# Patient Record
Sex: FEMALE | Race: WHITE | NOT HISPANIC OR LATINO | Employment: OTHER | URBAN - METROPOLITAN AREA
[De-identification: names, ages, dates, MRNs, and addresses within clinical notes are randomized per-mention and may not be internally consistent; named-entity substitution may affect disease eponyms.]

---

## 2022-11-10 ENCOUNTER — HOSPITAL ENCOUNTER (OUTPATIENT)
Facility: MEDICAL CENTER | Age: 32
End: 2022-11-10
Attending: OBSTETRICS & GYNECOLOGY
Payer: COMMERCIAL

## 2022-11-10 PROCEDURE — 88175 CYTOPATH C/V AUTO FLUID REDO: CPT

## 2022-11-10 PROCEDURE — 87624 HPV HI-RISK TYP POOLED RSLT: CPT

## 2022-11-14 LAB
CYTOLOGY REG CYTOL: NORMAL
HPV HR 12 DNA CVX QL NAA+PROBE: NEGATIVE
HPV16 DNA SPEC QL NAA+PROBE: NEGATIVE
HPV18 DNA SPEC QL NAA+PROBE: NEGATIVE
SPECIMEN SOURCE: NORMAL

## 2024-12-02 ENCOUNTER — APPOINTMENT (OUTPATIENT)
Dept: ADMISSIONS | Facility: MEDICAL CENTER | Age: 34
End: 2024-12-02
Attending: OBSTETRICS & GYNECOLOGY
Payer: COMMERCIAL

## 2024-12-06 ENCOUNTER — PRE-ADMISSION TESTING (OUTPATIENT)
Dept: ADMISSIONS | Facility: MEDICAL CENTER | Age: 34
End: 2024-12-06
Attending: OBSTETRICS & GYNECOLOGY
Payer: COMMERCIAL

## 2024-12-06 NOTE — OR NURSING
Preadmit phone appt completed, medication/fasting/hydration instructions given per anesthesia guidelines, pt verbalize understanding.  Preop labs-[facility] lives in [place] [facility] requesting to do beta hcg there as well, called Dr Roldan's office, they will contact [facility].

## 2024-12-18 NOTE — H&P
DATE OF ADMISSION:  2024     PREOPERATIVE DIAGNOSES:  1.  Dysmenorrhea.  2.  Dyspareunia.  3.  History of pelvic congestive syndrome.  4.  Abnormal uterine bleeding.  5.  Desires conservative surgical management.     PLANNED PROCEDURE:  Pelvic examination under anesthesia, pelviscopy, lysis of   adhesions, excision/fulguration of endometriosis implants, diagnostic and   operative hysteroscopy, excision of endometrial polyp/submucosal   fibroid/endometrial curettage with the MyoSure/oscillating cutting blade, and   any other medically necessary procedures.     HISTORY OF PRESENT ILLNESS:  The patient is a 34-year-old para 2-0-0-2,   sexually active woman with last menstrual period of 2024, who has a   known history of pelvic congestion syndrome and she was having continued   pelvic pain and abnormal uterine bleeding, who desires conservative surgical   management.  The patient has never before had a pelviscopy to assess for   endometriosis.  The patient underwent a pelvic ultrasound, which demonstrated   that her uterus measured 10.8 x 4.3 x 6.3 cm.  The endometrium measured 4 mm.    The right ovary measured 1.6 x 2.2 x 2.3 cm.  The left ovary measured 2.2 x   2.4 x 1.6 cm.  There were prominent pelvic varicosities, which can be seen   with pelvic congestion syndrome.  The patient desires conservative surgical   management.  The risks, benefits and alternatives of the above procedure were   discussed with the patient and she agrees to proceed.     PAST MEDICAL HISTORY:   1.  Dysmenorrhea.  2.  Pelvic congestion syndrome.  3.  Premenstrual dysphoric disorder.     PAST SURGICAL HISTORY:  None.     PAST OBSTETRIC HISTORY:   x2.     ALLERGIES:  COPPER LEADING TO MENTAL FOG.     GYNECOLOGIC HISTORY:  She denies STDs, PID, abnormal Paps or HSV.     SOCIAL HISTORY:  She denies alcohol, tobacco or drugs of abuse.     FAMILY HISTORY:  Noncontributory.     REVIEW OF SYSTEMS:  Negative.     MEDICATIONS:   Magnesium and B complex.     PHYSICAL EXAMINATION:  VITAL SIGNS:  Blood pressure 125/84, pulse 84, temperature 98.2.  GENERAL:  Alert, awake and oriented x3, in no acute distress.  LUNGS:  Clear to auscultation bilaterally.  HEART:  Regular rate and rhythm.  No murmurs, rubs or gallops.  S1, S2 intact.  SKIN:  Within normal limits.  EXTREMITIES:  No clubbing, cyanosis or edema.  GENITOURINARY:  Deferred.     PREOPERATIVE LABORATORY STUDIES:  Are as follows:  White count 6.4, hemoglobin   13.9, hematocrit 43.2, platelets 261.  Quantitative beta hCG is negative.     ASSESSMENT AND PLAN:  A 34-year-old para 2-0-0-2, sexually active woman with a   last menstrual period of 11/12/2024 and a history of pelvic congestion   syndrome, who also has dysmenorrhea, pelvic pain and dyspareunia as well as   abnormal uterine bleeding and who desires conservative surgical management.    We will proceed to the operating room for a pelvic examination under   anesthesia, pelviscopy, lysis of adhesions, excision/fulguration of   endometriosis implants, diagnostic and operative hysteroscopy, excision of   endometrial polyp/submucosal fibroid/endometrial curettage with the   MyoSure/oscillating cutting blade, and any other medically necessary   procedures.  Risks, benefits and alternatives discussed and the patient agrees   to proceed.        ______________________________  MD JAYESH Freeman/NAOMI    DD:  12/18/2024 11:00  DT:  12/18/2024 11:17    Job#:  [id number]

## 2024-12-19 ENCOUNTER — ANESTHESIA (OUTPATIENT)
Dept: SURGERY | Facility: MEDICAL CENTER | Age: 34
End: 2024-12-19
Payer: COMMERCIAL

## 2024-12-19 ENCOUNTER — HOSPITAL ENCOUNTER (OUTPATIENT)
Facility: MEDICAL CENTER | Age: 34
End: 2024-12-19
Attending: OBSTETRICS & GYNECOLOGY | Admitting: OBSTETRICS & GYNECOLOGY
Payer: COMMERCIAL

## 2024-12-19 ENCOUNTER — ANESTHESIA EVENT (OUTPATIENT)
Dept: SURGERY | Facility: MEDICAL CENTER | Age: 34
End: 2024-12-19
Payer: COMMERCIAL

## 2024-12-19 VITALS
BODY MASS INDEX: 25.7 KG/M2 | HEIGHT: 69 IN | TEMPERATURE: 97.3 F | RESPIRATION RATE: 18 BRPM | SYSTOLIC BLOOD PRESSURE: 126 MMHG | WEIGHT: 173.5 LBS | HEART RATE: 51 BPM | OXYGEN SATURATION: 96 % | DIASTOLIC BLOOD PRESSURE: 68 MMHG

## 2024-12-19 LAB
ABO GROUP BLD: NORMAL
BLD GP AB SCN SERPL QL: NORMAL
HCG UR QL: NEGATIVE
PATHOLOGY CONSULT NOTE: NORMAL
RH BLD: NORMAL

## 2024-12-19 PROCEDURE — 700101 HCHG RX REV CODE 250: Performed by: ANESTHESIOLOGY

## 2024-12-19 PROCEDURE — 86901 BLOOD TYPING SEROLOGIC RH(D): CPT

## 2024-12-19 PROCEDURE — 88304 TISSUE EXAM BY PATHOLOGIST: CPT

## 2024-12-19 PROCEDURE — 700105 HCHG RX REV CODE 258: Performed by: ANESTHESIOLOGY

## 2024-12-19 PROCEDURE — 700105 HCHG RX REV CODE 258: Performed by: OBSTETRICS & GYNECOLOGY

## 2024-12-19 PROCEDURE — 700101 HCHG RX REV CODE 250: Performed by: OBSTETRICS & GYNECOLOGY

## 2024-12-19 PROCEDURE — 160009 HCHG ANES TIME/MIN: Performed by: OBSTETRICS & GYNECOLOGY

## 2024-12-19 PROCEDURE — 160002 HCHG RECOVERY MINUTES (STAT): Performed by: OBSTETRICS & GYNECOLOGY

## 2024-12-19 PROCEDURE — A9270 NON-COVERED ITEM OR SERVICE: HCPCS | Performed by: ANESTHESIOLOGY

## 2024-12-19 PROCEDURE — 160025 RECOVERY II MINUTES (STATS): Performed by: OBSTETRICS & GYNECOLOGY

## 2024-12-19 PROCEDURE — 700102 HCHG RX REV CODE 250 W/ 637 OVERRIDE(OP): Performed by: ANESTHESIOLOGY

## 2024-12-19 PROCEDURE — 160029 HCHG SURGERY MINUTES - 1ST 30 MINS LEVEL 4: Performed by: OBSTETRICS & GYNECOLOGY

## 2024-12-19 PROCEDURE — 86900 BLOOD TYPING SEROLOGIC ABO: CPT

## 2024-12-19 PROCEDURE — 81025 URINE PREGNANCY TEST: CPT

## 2024-12-19 PROCEDURE — 86850 RBC ANTIBODY SCREEN: CPT

## 2024-12-19 PROCEDURE — 700111 HCHG RX REV CODE 636 W/ 250 OVERRIDE (IP): Mod: JZ | Performed by: ANESTHESIOLOGY

## 2024-12-19 PROCEDURE — 160035 HCHG PACU - 1ST 60 MINS PHASE I: Performed by: OBSTETRICS & GYNECOLOGY

## 2024-12-19 PROCEDURE — 88305 TISSUE EXAM BY PATHOLOGIST: CPT

## 2024-12-19 PROCEDURE — 160041 HCHG SURGERY MINUTES - EA ADDL 1 MIN LEVEL 4: Performed by: OBSTETRICS & GYNECOLOGY

## 2024-12-19 PROCEDURE — 160048 HCHG OR STATISTICAL LEVEL 1-5: Performed by: OBSTETRICS & GYNECOLOGY

## 2024-12-19 PROCEDURE — 160047 HCHG PACU  - EA ADDL 30 MINS PHASE II: Performed by: OBSTETRICS & GYNECOLOGY

## 2024-12-19 PROCEDURE — 160046 HCHG PACU - 1ST 60 MINS PHASE II: Performed by: OBSTETRICS & GYNECOLOGY

## 2024-12-19 RX ORDER — ONDANSETRON 2 MG/ML
4 INJECTION INTRAMUSCULAR; INTRAVENOUS
Status: COMPLETED | OUTPATIENT
Start: 2024-12-19 | End: 2024-12-19

## 2024-12-19 RX ORDER — DEXAMETHASONE SODIUM PHOSPHATE 4 MG/ML
INJECTION, SOLUTION INTRA-ARTICULAR; INTRALESIONAL; INTRAMUSCULAR; INTRAVENOUS; SOFT TISSUE PRN
Status: DISCONTINUED | OUTPATIENT
Start: 2024-12-19 | End: 2024-12-19 | Stop reason: SURG

## 2024-12-19 RX ORDER — HYDROMORPHONE HYDROCHLORIDE 2 MG/ML
INJECTION, SOLUTION INTRAMUSCULAR; INTRAVENOUS; SUBCUTANEOUS PRN
Status: DISCONTINUED | OUTPATIENT
Start: 2024-12-19 | End: 2024-12-19 | Stop reason: SURG

## 2024-12-19 RX ORDER — HYDROMORPHONE HYDROCHLORIDE 1 MG/ML
0.4 INJECTION, SOLUTION INTRAMUSCULAR; INTRAVENOUS; SUBCUTANEOUS
Status: DISCONTINUED | OUTPATIENT
Start: 2024-12-19 | End: 2024-12-19 | Stop reason: HOSPADM

## 2024-12-19 RX ORDER — ONDANSETRON 2 MG/ML
INJECTION INTRAMUSCULAR; INTRAVENOUS PRN
Status: DISCONTINUED | OUTPATIENT
Start: 2024-12-19 | End: 2024-12-19 | Stop reason: SURG

## 2024-12-19 RX ORDER — BUPIVACAINE HYDROCHLORIDE AND EPINEPHRINE 2.5; 5 MG/ML; UG/ML
INJECTION, SOLUTION EPIDURAL; INFILTRATION; INTRACAUDAL; PERINEURAL
Status: DISCONTINUED | OUTPATIENT
Start: 2024-12-19 | End: 2024-12-19 | Stop reason: HOSPADM

## 2024-12-19 RX ORDER — SODIUM CHLORIDE, SODIUM LACTATE, POTASSIUM CHLORIDE, CALCIUM CHLORIDE 600; 310; 30; 20 MG/100ML; MG/100ML; MG/100ML; MG/100ML
INJECTION, SOLUTION INTRAVENOUS CONTINUOUS
Status: DISCONTINUED | OUTPATIENT
Start: 2024-12-19 | End: 2024-12-19 | Stop reason: HOSPADM

## 2024-12-19 RX ORDER — BUPIVACAINE HYDROCHLORIDE AND EPINEPHRINE 2.5; 5 MG/ML; UG/ML
INJECTION, SOLUTION EPIDURAL; INFILTRATION; INTRACAUDAL; PERINEURAL
Status: DISCONTINUED
Start: 2024-12-19 | End: 2024-12-19 | Stop reason: HOSPADM

## 2024-12-19 RX ORDER — HYDROMORPHONE HYDROCHLORIDE 1 MG/ML
0.2 INJECTION, SOLUTION INTRAMUSCULAR; INTRAVENOUS; SUBCUTANEOUS
Status: DISCONTINUED | OUTPATIENT
Start: 2024-12-19 | End: 2024-12-19 | Stop reason: HOSPADM

## 2024-12-19 RX ORDER — HALOPERIDOL 5 MG/ML
1 INJECTION INTRAMUSCULAR
Status: DISCONTINUED | OUTPATIENT
Start: 2024-12-19 | End: 2024-12-19 | Stop reason: HOSPADM

## 2024-12-19 RX ORDER — MIDAZOLAM HYDROCHLORIDE 1 MG/ML
INJECTION INTRAMUSCULAR; INTRAVENOUS PRN
Status: DISCONTINUED | OUTPATIENT
Start: 2024-12-19 | End: 2024-12-19 | Stop reason: SURG

## 2024-12-19 RX ORDER — DIPHENHYDRAMINE HYDROCHLORIDE 50 MG/ML
12.5 INJECTION INTRAMUSCULAR; INTRAVENOUS
Status: DISCONTINUED | OUTPATIENT
Start: 2024-12-19 | End: 2024-12-19 | Stop reason: HOSPADM

## 2024-12-19 RX ORDER — LIDOCAINE HYDROCHLORIDE 20 MG/ML
INJECTION, SOLUTION EPIDURAL; INFILTRATION; INTRACAUDAL; PERINEURAL PRN
Status: DISCONTINUED | OUTPATIENT
Start: 2024-12-19 | End: 2024-12-19 | Stop reason: SURG

## 2024-12-19 RX ORDER — ACETAMINOPHEN 500 MG
1000 TABLET ORAL ONCE
Status: COMPLETED | OUTPATIENT
Start: 2024-12-19 | End: 2024-12-19

## 2024-12-19 RX ORDER — SCOLOPAMINE TRANSDERMAL SYSTEM 1 MG/1
1 PATCH, EXTENDED RELEASE TRANSDERMAL
Status: DISCONTINUED | OUTPATIENT
Start: 2024-12-19 | End: 2024-12-19 | Stop reason: HOSPADM

## 2024-12-19 RX ORDER — METHOCARBAMOL 500 MG/1
500 TABLET, FILM COATED ORAL ONCE
Status: COMPLETED | OUTPATIENT
Start: 2024-12-19 | End: 2024-12-19

## 2024-12-19 RX ORDER — HYDROMORPHONE HYDROCHLORIDE 1 MG/ML
0.1 INJECTION, SOLUTION INTRAMUSCULAR; INTRAVENOUS; SUBCUTANEOUS
Status: DISCONTINUED | OUTPATIENT
Start: 2024-12-19 | End: 2024-12-19 | Stop reason: HOSPADM

## 2024-12-19 RX ORDER — CELECOXIB 200 MG/1
200 CAPSULE ORAL ONCE
Status: COMPLETED | OUTPATIENT
Start: 2024-12-19 | End: 2024-12-19

## 2024-12-19 RX ORDER — ROCURONIUM BROMIDE 10 MG/ML
INJECTION, SOLUTION INTRAVENOUS PRN
Status: DISCONTINUED | OUTPATIENT
Start: 2024-12-19 | End: 2024-12-19 | Stop reason: SURG

## 2024-12-19 RX ORDER — SIMETHICONE 125 MG
125 TABLET,CHEWABLE ORAL 3 TIMES DAILY PRN
Status: DISCONTINUED | OUTPATIENT
Start: 2024-12-19 | End: 2024-12-19 | Stop reason: HOSPADM

## 2024-12-19 RX ORDER — OXYCODONE HCL 5 MG/5 ML
10 SOLUTION, ORAL ORAL
Status: COMPLETED | OUTPATIENT
Start: 2024-12-19 | End: 2024-12-19

## 2024-12-19 RX ORDER — IPRATROPIUM BROMIDE AND ALBUTEROL SULFATE 2.5; .5 MG/3ML; MG/3ML
3 SOLUTION RESPIRATORY (INHALATION)
Status: DISCONTINUED | OUTPATIENT
Start: 2024-12-19 | End: 2024-12-19 | Stop reason: HOSPADM

## 2024-12-19 RX ORDER — SODIUM CHLORIDE 9 MG/ML
INJECTION, SOLUTION INTRAVENOUS ONCE
Status: COMPLETED | OUTPATIENT
Start: 2024-12-19 | End: 2024-12-19

## 2024-12-19 RX ORDER — LIDOCAINE HYDROCHLORIDE 40 MG/ML
SOLUTION TOPICAL PRN
Status: DISCONTINUED | OUTPATIENT
Start: 2024-12-19 | End: 2024-12-19 | Stop reason: SURG

## 2024-12-19 RX ORDER — CEFAZOLIN SODIUM 1 G/3ML
INJECTION, POWDER, FOR SOLUTION INTRAMUSCULAR; INTRAVENOUS PRN
Status: DISCONTINUED | OUTPATIENT
Start: 2024-12-19 | End: 2024-12-19 | Stop reason: SURG

## 2024-12-19 RX ORDER — OXYCODONE HCL 5 MG/5 ML
5 SOLUTION, ORAL ORAL
Status: COMPLETED | OUTPATIENT
Start: 2024-12-19 | End: 2024-12-19

## 2024-12-19 RX ORDER — SODIUM CHLORIDE, SODIUM LACTATE, POTASSIUM CHLORIDE, CALCIUM CHLORIDE 600; 310; 30; 20 MG/100ML; MG/100ML; MG/100ML; MG/100ML
INJECTION, SOLUTION INTRAVENOUS
Status: DISCONTINUED | OUTPATIENT
Start: 2024-12-19 | End: 2024-12-19 | Stop reason: SURG

## 2024-12-19 RX ORDER — MEPERIDINE HYDROCHLORIDE 25 MG/ML
12.5 INJECTION INTRAMUSCULAR; INTRAVENOUS; SUBCUTANEOUS
Status: DISCONTINUED | OUTPATIENT
Start: 2024-12-19 | End: 2024-12-19 | Stop reason: HOSPADM

## 2024-12-19 RX ADMIN — FENTANYL CITRATE 25 MCG: 50 INJECTION, SOLUTION INTRAMUSCULAR; INTRAVENOUS at 16:27

## 2024-12-19 RX ADMIN — SODIUM CHLORIDE: 9 INJECTION, SOLUTION INTRAVENOUS at 12:42

## 2024-12-19 RX ADMIN — LIDOCAINE HYDROCHLORIDE 4 ML: 40 SOLUTION TOPICAL at 14:48

## 2024-12-19 RX ADMIN — DEXAMETHASONE SODIUM PHOSPHATE 8 MG: 4 INJECTION INTRA-ARTICULAR; INTRALESIONAL; INTRAMUSCULAR; INTRAVENOUS; SOFT TISSUE at 14:47

## 2024-12-19 RX ADMIN — ACETAMINOPHEN 1000 MG: 500 TABLET ORAL at 14:20

## 2024-12-19 RX ADMIN — ROCURONIUM BROMIDE 50 MG: 50 INJECTION, SOLUTION INTRAVENOUS at 14:47

## 2024-12-19 RX ADMIN — PROPOFOL 50 MG: 10 INJECTION, EMULSION INTRAVENOUS at 15:47

## 2024-12-19 RX ADMIN — PROPOFOL 150 MG: 10 INJECTION, EMULSION INTRAVENOUS at 14:47

## 2024-12-19 RX ADMIN — HALOPERIDOL LACTATE 1 MG: 5 INJECTION, SOLUTION INTRAMUSCULAR at 17:35

## 2024-12-19 RX ADMIN — SCOPOLAMINE 1 PATCH: 1.5 PATCH, EXTENDED RELEASE TRANSDERMAL at 14:20

## 2024-12-19 RX ADMIN — CEFAZOLIN 2 G: 1 INJECTION, POWDER, FOR SOLUTION INTRAMUSCULAR; INTRAVENOUS at 14:47

## 2024-12-19 RX ADMIN — SODIUM CHLORIDE, POTASSIUM CHLORIDE, SODIUM LACTATE AND CALCIUM CHLORIDE: 600; 310; 30; 20 INJECTION, SOLUTION INTRAVENOUS at 14:41

## 2024-12-19 RX ADMIN — HYDROMORPHONE HYDROCHLORIDE 1 MG: 2 INJECTION INTRAMUSCULAR; INTRAVENOUS; SUBCUTANEOUS at 14:43

## 2024-12-19 RX ADMIN — MIDAZOLAM HYDROCHLORIDE 2 MG: 1 INJECTION, SOLUTION INTRAMUSCULAR; INTRAVENOUS at 14:43

## 2024-12-19 RX ADMIN — SUGAMMADEX 200 MG: 100 INJECTION, SOLUTION INTRAVENOUS at 15:33

## 2024-12-19 RX ADMIN — ONDANSETRON 4 MG: 2 INJECTION INTRAMUSCULAR; INTRAVENOUS at 15:42

## 2024-12-19 RX ADMIN — METHOCARBAMOL TABLETS 500 MG: 500 TABLET, COATED ORAL at 14:20

## 2024-12-19 RX ADMIN — FENTANYL CITRATE 50 MCG: 50 INJECTION, SOLUTION INTRAMUSCULAR; INTRAVENOUS at 16:40

## 2024-12-19 RX ADMIN — ONDANSETRON 4 MG: 2 INJECTION INTRAMUSCULAR; INTRAVENOUS at 17:22

## 2024-12-19 RX ADMIN — CELECOXIB 200 MG: 200 CAPSULE ORAL at 14:20

## 2024-12-19 RX ADMIN — OXYCODONE HYDROCHLORIDE 10 MG: 5 SOLUTION ORAL at 16:26

## 2024-12-19 RX ADMIN — LIDOCAINE HYDROCHLORIDE 80 MG: 20 INJECTION, SOLUTION EPIDURAL; INFILTRATION; INTRACAUDAL; PERINEURAL at 14:47

## 2024-12-19 ASSESSMENT — PAIN DESCRIPTION - PAIN TYPE
TYPE: SURGICAL PAIN

## 2024-12-19 NOTE — OR SURGEON
Immediate Post OP Note    PreOp Diagnosis:     1.  Dysmenorrhea.  2.  Dyspareunia.  3.  History of pelvic congestive syndrome.  4.  Abnormal uterine bleeding.  5.  Desires conservative surgical management.  6.  Dysuria.      PostOp Diagnosis:    As above.  Bilateral paratubal cysts.  Right ovary.  Interstitial cystitis likely.  Endometrial polyps likely - pathology pending.      Procedure(s):  PELVIC EXAM UNDER ANESTHESIA, PELVISCOPY, LYSIS OF ADHESIONS, EXCISION/FULGURATION OF ENDOMETRIOSIS IMPLANTS, CYSTOSCOPY, DIAGNOSTIC AND OPERATIVE HYSTEROSCOPY, EXCISION OF POLYP/FIBROID/ENDOMETRIAL CURETTAGE AND ANY OTHER MEDICALLY NECESSARY PROCEDURES  CYSTOSCOPY  PELVISCOPY    Surgeon(s):  Ilene Hollis M.D.    Anesthesiologist/Type of Anesthesia:  Anesthesiologist: Vadim Ceballos M.D./RUMA and local    Surgical Staff:  Circulator: Yvonne Chaparro R.N.    Specimens removed if any:   Bilateral paratubal cysts - left with appearance of endometriosis.  2.  Right ovarian cyst tissue.  3.  Endometrial curettings and endometrial polyps.    Estimated Blood Loss: 10 cc    Findings: EUA - uterus is AV mobile and 10 weeks size.  No obvious adnexal masses.  Normal cervix.  8 BETSY manipulator used.    Laparoscopic findings: normal uterus.  Bilateral ureters visualized.  Normal appendix and upper abdomen.  Bilateral paratubal pedunculated cysts with the left having endometriosis appearance.  Serous fluid filled right ovarian cyst - 3 cm.  Pelvic peritoneum is erythematous consistent with inflammation.  No evidence of pelvic or abdominal adhesive disease.  No evidence of ureterosacral ligament endometriosis.      Cytoscopic findings: 500 cc of saline instilled into the bladder and the bladder appears erythematous and with dilated vasculature likely consistent with interstitial cystitis.    Hysteroscopic findings: plush endometrium likely with endometrial polyps.  Tissue to pathology.  Unable to visualize the bilateral tubal  ostia.  105 cc of fluid loss.  MyosureLite used during the procedure.    Complications: None apparent.        12/19/2024 1:19 PM Ilene Hollis M.D.

## 2024-12-19 NOTE — OR NURSING
Patient arrived, but still waiting to see PAR. Urine collected and sent down to lab. Pt back out to lobby.

## 2024-12-19 NOTE — ANESTHESIA PREPROCEDURE EVALUATION
" Case: [id number] Date/Time: 12/19/24 1315    Procedures:       PELVIC EXAM UNDER ANESTHESIA, PELVISCOPY, LYSIS OF ADHESIONS, EXCISION/FULGURATION OF ENDOMETRIOSIS IMPLANTS, CYSTOSCOPY, DIAGNOSTIC AND OPERATIVE HYSTEROSCOPY, EXCISION OF POLYP/FIBROID/ENDOMETRIAL CURETTAGE AND ANY OTHER MEDICALLY NECESSARY PROCEDURES      CYSTOSCOPY      PELVISCOPY    Pre-op diagnosis: ABNORMAL UTERINE BLEEDING, DYSMENORRHEA AND MENORRHAGIA    Location: [facility] ROOM 25 / SURGERY SAME DAY [facility]    Surgeons: Ilene Hollis M.D.            Past Medical History:   Diagnosis Date    Migraine     Urinary tract infection, site not specified     frequently since childhood       History reviewed. No pertinent surgical history.    Current Outpatient Medications   Medication Instructions    ibuprofen (MOTRIN) 600 mg, Oral, EVERY 6 HOURS PRN       Social History     Tobacco Use    Smoking status: Never   Vaping Use    Vaping status: Never Used   Substance Use Topics    Alcohol use: No     Comment: socially    Drug use: No       /71   Pulse 62   Temp 36.9 °C (98.4 °F) (Temporal)   Resp 18   Ht 1.753 m (5' 9\")   Wt 78.7 kg (173 lb 8 oz)   SpO2 99%     Allergies   Allergen Reactions    Macrodantin [Nitrofurantoin] Rash    Copper Rash       Lab Results   Component Value Date/Time    SODIUM 139 07/11/2013 08:49 AM    POTASSIUM 3.9 07/11/2013 08:49 AM    CHLORIDE 107 07/11/2013 08:49 AM    GLUCOSE 76 07/11/2013 08:49 AM    BUN 8 07/11/2013 08:49 AM    CREATININE 0.65 07/11/2013 08:49 AM   HCG negative today     Lab Results   Component Value Date/Time    WBC 10.9 (H) 01/24/2016 03:50 AM    RBC 3.35 (L) 01/24/2016 03:50 AM    HEMOGLOBIN 10.3 (L) 01/24/2016 03:50 AM    HEMATOCRIT 32.3 (L) 01/24/2016 03:50 AM    PLATELETCT 152 (L) 01/24/2016 03:50 AM        Relevant Problems   No relevant active problems       Physical Exam    Airway   Mallampati: II  TM distance: >3 FB  Neck ROM: full       Cardiovascular - normal exam  Rhythm: " regular  Rate: normal  (-) murmur     Dental - normal exam           Pulmonary - normal exam  Breath sounds clear to auscultation     Abdominal    Neurological - normal exam                   Anesthesia Plan    ASA 2       Plan - general       Airway plan will be ETT          Induction: intravenous    Postoperative Plan: Postoperative administration of opioids is intended.    Pertinent diagnostic labs and testing reviewed    Informed Consent:    Anesthetic plan and risks discussed with patient.      Anesthetic procedure and risks discussed with patient in detail.  Risks include but are not limited to PONV, pain, sore throat, damage to teeth/lips/gums, aspiration, positioning injury, allergic reaction, vocal cord injury, prolonged intubation, stroke, and/or cardiopulmonary problems up to and including death.  Patient indicates complete understanding. All questions fully answered and they agree to proceed as planned above.

## 2024-12-19 NOTE — ANESTHESIA PROCEDURE NOTES
Airway    Date/Time: 12/19/2024 2:48 PM    Performed by: Vadim Ceballos M.D.  Authorized by: Vadim Ceballos M.D.    Location:  OR  Urgency:  Elective  Indications for Airway Management:  Anesthesia      Spontaneous Ventilation: absent    Sedation Level:  Deep  Preoxygenated: Yes    Patient Position:  Sniffing  Final Airway Type:  Endotracheal airway  Final Endotracheal Airway:  ETT  Cuffed: Yes    Technique Used for Successful ETT Placement:  Direct laryngoscopy    Insertion Site:  Oral  Blade Type:  Nisha  Laryngoscope Blade/Videolaryngoscope Blade Size:  3  ETT Size (mm):  7.0  Measured from:  Teeth  ETT to Teeth (cm):  21  Placement Verified by: auscultation and capnometry    Cormack-Lehane Classification:  Grade I - full view of glottis  Number of Attempts at Approach:  1

## 2024-12-20 ASSESSMENT — PAIN SCALES - GENERAL: PAIN_LEVEL: 2

## 2024-12-20 NOTE — ANESTHESIA TIME REPORT
Anesthesia Start and Stop Event Times       Date Time Event    12/19/2024 1414 Ready for Procedure     1441 Anesthesia Start     1606 Anesthesia Stop          Responsible Staff  12/19/24      Name Role Begin End    Vadim Ceballos M.D. Anesth 1441 1606          Overtime Reason:  overtime with call-back    Comments:

## 2024-12-20 NOTE — OR NURSING
3497- pt arrives from OR to PACU 4, report received from RN and anesthesia. Pt place on monitor. VSS,  NAD noted. Oral airway in place.   O2 8L via mask.    Lap sites x 2 CDI, peripad in place- CDI    1609-OPA d/c'd    1615-hand off to BYRON Butt

## 2024-12-20 NOTE — DISCHARGE INSTRUCTIONS
If any questions arise, call your provider.  If your provider is not available, please feel free to call the Surgical Center at [phone].    MEDICATIONS: Resume taking daily medication.  Take prescribed pain medication with food.  If no medication is prescribed, you may take non-aspirin pain medication if needed.  PAIN MEDICATION CAN BE VERY CONSTIPATING.  Take a stool softener or laxative such as senokot, pericolace, or milk of magnesia if needed.    Last pain medication given  Tylenol given at 2:20 PM. Okay for next dose at 8:20 PM  Celebrex (similar to Ibuprofen/ Motrin) given at 2:20 PM. Okay for next dose at 8:20 PM  Robaxin given at 2:20 PM  Oxycodone given at 4:30 PM. Okay for next dose of percocet at 10:30 PM    What to Expect Post Anesthesia    Rest and take it easy for the first 24 hours.  A responsible adult is recommended to remain with you during that time.  It is normal to feel sleepy.  We encourage you to not do anything that requires balance, judgment or coordination.    FOR 24 HOURS DO NOT:  Drive, operate machinery or run household appliances.  Drink beer or alcoholic beverages.  Make important decisions or sign legal documents.    To avoid nausea, slowly advance diet as tolerated, avoiding spicy or greasy foods for the first day.  Add more substantial food to your diet according to your provider's instructions.  Babies can be fed formula or breast milk as soon as they are hungry.  INCREASE FLUIDS AND FIBER TO AVOID CONSTIPATION.    MILD FLU-LIKE SYMPTOMS ARE NORMAL.  YOU MAY EXPERIENCE GENERALIZED MUSCLE ACHES, THROAT IRRITATION, HEADACHE AND/OR SOME NAUSEA.

## 2024-12-20 NOTE — OR NURSING
1615: assumed care of patient. Patient is tolerating liquids.     1625: patient complaining of pain, medicated per MAR  Pt's  called and updated on POC    1640-medicated per MAR for continued pain, pt tearful    1655- brought back to bedside    1710: pt to bathroom, - void. Pt assisted into clothing. Pt sitting up in recliner. C/o nausea. Medicated per MAR    1825: pt up to bathroom, + void    1830: Discharge instructions reviewed with patient and family member. All questions answered, verbalizes understanding.     1841: IV and ID bands removed. Pt then escorted to car via wheelchair, accompanied by RN. All personal belongings & discharge instructions with patient/family.

## 2024-12-20 NOTE — OP REPORT
DATE OF SERVICE:  12/19/2024     PREOPERATIVE DIAGNOSES:  1.  Dysmenorrhea.  2.  Dyspareunia.  3.  Dysuria.  4.  History of pelvic congestion syndrome.  5.  Abnormal uterine bleeding.  6.  Desires conservative surgical management.     POSTOPERATIVE DIAGNOSES:    1.  Dysmenorrhea.  2.  Dyspareunia.  3.  Dysuria.  4.  History of pelvic congestion syndrome.  5.  Abnormal uterine bleeding.  6.  Desires conservative surgical management.  7.  Bilateral paratubal cysts.  8.  Right ovarian cyst.  9.  Interstitial cystitis.  10.  Endometrial polyps, pathology pending.     PROCEDURES:    1.  Pelvic examination under anesthesia.  2.  Pelviscopy.  3.  Bilateral tubal cyst excision.  4.  Right ovarian cystectomy.  5.  Cystoscopy with bladder distention.  6.  Diagnostic and operative hysteroscopy.  7.  Excision of endometrial polyp and endometrial curettage with the MyoSure.     SURGEON:  Ilene Hollis MD     ANESTHESIOLOGIST:  Vadim Ceballos MD     ANESTHESIA:  General endotracheal tube anesthesia and local anesthetic.     SPECIMENS:    1.  Bilateral paratubal cysts.  Left paratubal cyst with the appearance of   endometriosis.  2.  Right ovarian cyst tissue.  3.  Endometrial curettings and endometrial polyp.     ESTIMATED BLOOD LOSS:  10 mL     FINDINGS:  On examination under anesthesia, her uterus is anteverted, mobile   and 10 week size.  There are no obvious adnexal masses.  Normal cervix.  An 8   BETSY manipulator was used during the procedure.     LAPAROSCOPIC FINDINGS:  Normal uterus.  Bilateral ureters are visualized.    Normal appendix and upper abdomen.  Bilateral paratubal pedunculated cysts   noted with the left having an endometriosis appearance with dark hemorrhagic   material noted.  Serous fluid-filled ovarian cyst on the right ovary measuring   3 cm in greatest dimension.  The pelvic peritoneum is erythematous consistent   with inflammation.  No evidence of pelvic or abdominal adhesive disease.  No    evidence of endometriosis implants in the anterior, posterior cul-de-sac or   bilateral uterosacral ligaments.     CYSTOSCOPIC FINDINGS:  500 mL of saline were instilled into the bladder and   upon bladder distention, the bladder mucosa appears erythematous and the   vasculature appears dilated, likely consistent with interstitial cystitis.     HYSTEROSCOPIC FINDINGS:  Plush endometrium likely with endometrial polyps.    Unable to visualize adequately the bilateral tubal ostia.  105 mL fluid loss.    MyoSure Lite used during the procedure.     COMPLICATIONS:  None apparent.     INDICATIONS FOR THE PROCEDURE:  The patient is a 34-year-old para 2-0-0-2,   sexually active woman with last menstrual period of 11/12/2024 who has a known   history of pelvic congestion syndrome.  She continued to have pelvic pain and   abnormal uterine bleeding.     DETAILS OF THE PROCEDURE:  The patient was taken to the operating room where   she underwent general endotracheal tube anesthesia.  She was then placed in   the dorsal lithotomy position in the [facility].  Pelvic   examination under anesthesia was performed and the findings are documented   above.  The patient was prepped and draped in the dorsal lithotomy position.    A Hoang catheter was placed in her urinary bladder.  Medium Graves speculum   inserted into the vagina and the cervix was visualized and grasped with an   Allis clamp.  The endocervix was dilated to allow placement of the 8 BETSY   manipulator.     Attention turned to the laparoscopic portion of the procedure.  A 10 mm   infraumbilical skin incision was made with a scalpel after the instillation of   0.25% Marcaine with epinephrine.  The incision was carried down to the level   of the fascia.  The fascia was grasped with Kocher clamps, elevated and   incised with Preston scissors.  Either side of the fascial incision was sutured   with 0 Vicryl as stay sutures.  The S retractors were placed within  these   incisions.  The peritoneum was identified, grasped with the Pean clamp,   elevated and incised with Metzenbaum scissors.  The S retractors were placed   within all of these incisions and intraabdominal entry was confirmed as bowel   and omentum were visualized.  The patient was placed into steep Trendelenburg.    The pelvis and upper abdomen were explored and findings documented above.  A   second trocar site was identified 3 fingerbreadths above the pubic symphysis.    A 5 mm skin incision was made with a scalpel after the instillation of 0.25%   Marcaine with epinephrine.  The 5 mm trocar was inserted under direct   visualization.     The uterus was elevated and deviated bilaterally.  The bilateral fallopian   tubes and ovaries were examined and the bilateral ureters were identified   coursing through the medial leaf of the broad ligament.  The left fallopian   tube was identified and followed out to the fimbriated ends.  The paratubal   cyst was identified, grasped with the Prestige clamp, elevated, cauterized and   incised with the LigaSure and sent to pathology.  The right fallopian tube   and ovary were examined and the paratubal cyst was grasped with the Prestige   clamp, cauterized and incised with the LigaSure and sent to pathology.  The   right ovary was grasped with the Prestige clamp, cauterized with the LigaSure   and incised.  The ovarian cyst was removed using the LigaSure.  It was   cauterized, incised and then removed.  The pelvis was irrigated with normal   saline and small areas of bleeding on the right ovary were cauterized with the   LigaSure.  Surgicel was placed on the ovary where the ovarian cystectomy was   performed and on the bilateral fallopian tubes.  The trocars were removed   under direct visualization.  The CO2 gas was released from the patient's   abdomen.  The fascia was reapproximated using the stay sutures and a single   figure-of-X of 0 Vicryl suture was used in a  vertical mattress suture fashion   to bring the skin edges into closer proximity.  The skin was closed with 4-0   Monocryl in a subcuticular fashion.  The 5 mm skin incision was reapproximated   with 4-0 Monocryl in a subcuticular fashion.  The incisions were dressed with   benzoin, Steri-Strips, 2 x 2 and Tegaderm.     The BETSY manipulator was removed from the patient's uterine cavity.  The Hoang   catheter was removed and urine output was 200 mL of clear urine.  The   30-degree cystoscope was inserted under direct visualization.  500 mL of   saline were instilled into the bladder and findings documented above.  The   bladder was then drained.  The attention turned to the hysteroscopic findings.    A speculum was inserted.  The cervix was visualized and grasped with an   Allis clamp.  The endocervix was dilated to allow placement of the MyoSure   camera.  The MyoSure camera was inserted under direct visualization.  The   uterine cavity was examined and using the MyoSure Lite, a uterine curettage   and polypectomy was performed.  Before and after pictures were taken.  Tissue   was sent to pathology.  The hysteroscope and MyoSure oscillating cutting blade   were removed from the patient's uterine cavity.  The cervix was found to be   hemostatic.  The speculum was removed.     The patient was taken out of dorsal lithotomy.  She was then extubated and   taken to the PACU in stable condition.  Sponge, lap, needle counts were   correct x2.        ______________________________  MD JAYESH Freeman/JEFFREY    DD:  12/19/2024 21:08  DT:  12/19/2024 21:38    Job#:  [id number]

## 2024-12-20 NOTE — ANESTHESIA POSTPROCEDURE EVALUATION
Patient: Josefina Ospina    Procedure Summary       Date: 12/19/24 Room / Location: [facility] ROOM 25 / SURGERY SAME DAY [facility]    Anesthesia Start: 1441 Anesthesia Stop: 1606    Procedures:       PELVIC EXAM UNDER ANESTHESIA, PELVISCOPY,  CYSTOSCOPY, DIAGNOSTIC AND OPERATIVE HYSTEROSCOPY, EXCISION OF POLYP/FIBROID/ENDOMETRIAL CURETTAGE, RIGHT OVARIAN CYSTECTOMY (Vagina )      CYSTOSCOPY (Bladder)      PELVISCOPY (Abdomen) Diagnosis: (ABNORMAL UTERINE BLEEDING, DYSMENORRHEA AND MENORRHAGIA)    Surgeons: Ilene Hollis M.D. Responsible Provider: Vadim Ceballos M.D.    Anesthesia Type: general ASA Status: 2            Final Anesthesia Type: general  Last vitals  BP   Blood Pressure: 126/68    Temp   36.3 °C (97.3 °F)    Pulse   (!) 51   Resp   18    SpO2   96 %      Anesthesia Post Evaluation    Patient location during evaluation: PACU  Patient participation: complete - patient participated  Level of consciousness: sleepy but conscious  Pain score: 2    Airway patency: patent  Anesthetic complications: no  Cardiovascular status: hemodynamically stable  Respiratory status: acceptable  Hydration status: balanced    PONV: none          There were no known notable events for this encounter.     Nurse Pain Score: 2 (NPRS)

## (undated) DEVICE — PAD SANITARY 11IN MAXI IND WRAPPED (12EA/PK 24PK/CA)

## (undated) DEVICE — MASK OXYGEN VNYL ADLT MED CONC WITH 7 FOOT TUBING - (50EA/CA)

## (undated) DEVICE — DRAPE UNDER BUTTOCKS FLUID - (20/CA)

## (undated) DEVICE — SUCTION INSTRUMENT YANKAUER BULBOUS TIP W/O VENT (50EA/CA)

## (undated) DEVICE — TOWEL STOP TIMEOUT SAFETY FLAG (40EA/CA)

## (undated) DEVICE — SET TUBING PNEUMOCLEAR HIGH FLOW SMOKE EVACUATION (10EA/BX)

## (undated) DEVICE — ADHESIVE MASTISOL - (48/BX)

## (undated) DEVICE — SET IRRIGATION CYSTOSCOPY TUBE L80 IN (20EA/CA)

## (undated) DEVICE — WATER IRRIGATION STERILE 1000ML (12EA/CA)

## (undated) DEVICE — HEMOSTAT ABSORBABLE POWDER SURGICEL 3G (5EA/BX)

## (undated) DEVICE — SODIUM CHL IRRIGATION 0.9% 1000ML (12EA/CA)

## (undated) DEVICE — KIT  I.V. START (100EA/CA)

## (undated) DEVICE — Device

## (undated) DEVICE — SUTURE GENERAL

## (undated) DEVICE — GOWN WARMING STANDARD FLEX - (30/CA)

## (undated) DEVICE — TROCAR LAPSCP 100MM 12MM NTHRD - (6/BX)

## (undated) DEVICE — SENSOR OXIMETER ADULT SPO2 RD SET (20EA/BX)

## (undated) DEVICE — SOLUTION SORBITOL 3000ML (4/CA)

## (undated) DEVICE — NEEDLE NON SAFETY HYPO 22 GA X 1 1/2 IN (100/BX)

## (undated) DEVICE — SUTURE 4-0 MONOCRYL PLUS PS-2 - 27 INCH (36/BX)

## (undated) DEVICE — UTERINE MANIP RUMI 6.7X8 - (5/BX)

## (undated) DEVICE — SET TUBING AQUILEX IN-FLOW MYOSURE (10EA/BX)

## (undated) DEVICE — DRAPESURG STERI-DRAPE LONG - (10/BX 4BX/CA)

## (undated) DEVICE — ELECTRODE DUAL RETURN W/ CORD - (50/PK)

## (undated) DEVICE — SLEEVE VASO DVT COMPRESSION CALF MED - (10PR/CA)

## (undated) DEVICE — GLOVE BIOGEL INDICATOR SZ 7SURGICAL PF LTX - (50/BX 4BX/CA)

## (undated) DEVICE — CLOSURE SKIN STRIP 1/2 X 4 IN - (STERI STRIP) (50/BX 4BX/CA)

## (undated) DEVICE — GLOVE BIOGEL SZ 6.5 SURGICAL PF LTX (50PR/BX 4BX/CA)

## (undated) DEVICE — SET LEADWIRE 5 LEAD BEDSIDE DISPOSABLE ECG (1SET OF 5/EA)

## (undated) DEVICE — SUTURE 0 VICRYL PLUS CT-2 - 27 INCH (36/BX)

## (undated) DEVICE — CANISTER SUCTION 3000ML MECHANICAL FILTER AUTO SHUTOFF MEDI-VAC NONSTERILE LF DISP (40EA/CA)

## (undated) DEVICE — TUBING CLEARLINK DUO-VENT - C-FLO (48EA/CA)

## (undated) DEVICE — SET TUBING AQUILEX OUT-FLOW MYOSURE (10EA/BX)

## (undated) DEVICE — SPONGE GAUZESTER. 2X2 4-PL - (2/PK 50PK/BX 30BX/CS)

## (undated) DEVICE — LACTATED RINGERS INJ 1000 ML - (14EA/CA 60CA/PF)

## (undated) DEVICE — TROCAR 5X100 BLADED ADVANCE - FIXATION (6/BX)

## (undated) DEVICE — GLOVE BIOGEL PI INDICATOR SZ 7.0 SURGICAL PF LF - (50/BX 4BX/CA)

## (undated) DEVICE — APPLICATOR ENDOSCOPIC SURGICEL (5EA/BX)

## (undated) DEVICE — SYRINGE STERILE 10 ML LL (200/BX)

## (undated) DEVICE — CANNULA O2 COMFORT SOFT EAR ADULT 7 FT TUBING (50/CA)

## (undated) DEVICE — CANISTER SUCTION RIGID RED 1500CC (40EA/CA)

## (undated) DEVICE — JELLY SURGILUBE STERILE TUBE 4.25 OZ (1/EA)

## (undated) DEVICE — SYSTEM CLEARIFY VISUALIZATION (10EA/PK)

## (undated) DEVICE — TUBE CONNECTING SUCTION - CLEAR PLASTIC STERILE 72 IN (50EA/CA)

## (undated) DEVICE — TRAY FOLEY CATHETER STATLOCK 16FR SURESTEP (10EA/CA)

## (undated) DEVICE — DRESSING TRANSPARENT FILM TEGADERM 2.375 X 2.75" (100EA/BX)"

## (undated) DEVICE — HANDPIECE 10FT INTPLS SCT PLS IRRIGATION HAND CONTROL SET (6/PK)